# Patient Record
(demographics unavailable — no encounter records)

---

## 2025-05-13 NOTE — PHYSICAL EXAM
[General Appearance - Alert] : alert [General Appearance - In No Acute Distress] : in no acute distress [General Appearance - Well Nourished] : well nourished [Delayed in the Right Toes] : capillary refills normal in right toes [Delayed in the Left Toes] : capillary refills normal in the left toes [] : normal strength/tone [de-identified] : ROM of ankle, subtalar, midtarsal, MPJ, IPJ are adequate bilateral 5/5 muscle power in inversion, eversion, dorsiflexion, plantarflexion bilateral [Skin Turgor] : normal skin turgor [Foot Ulcer] : no foot ulcer [Position Sense Dec.] : diminished position sense at the level of the toes [FreeTextEntry1] : gross epicritic sensation to feet diminished, light touch sensation diminished, sharp/dull sensation intact [Oriented To Time, Place, And Person] : oriented to person, place, and time [Impaired Insight] : insight and judgment were intact [Affect] : the affect was normal

## 2025-05-13 NOTE — HISTORY OF PRESENT ILLNESS
[FreeTextEntry1] : Ms. AMISHA LOPEZ is a 59 year female who is seen in the office for interdigital tinea pedis bilateral feet. Patient denies any current or recent fever, chills, nausea, vomiting, SOB, or chest pain. AAOx3 and in NAD.

## 2025-05-13 NOTE — REASON FOR VISIT
[Follow-Up Visit] : a follow-up visit for [FreeTextEntry2] : interdigital tinea pedis bilateral feet

## 2025-05-13 NOTE — ASSESSMENT
[FreeTextEntry1] : 1) interdigital tinea pedis: recommend use of topical Rx econazole cream antifungal BID + OTC Lamisil cream BID, dry in between toes after showers  Follow up in 1 month [Verbal] : verbal [Patient] : patient [Good - alert, interested, motivated] : Good - alert, interested, motivated [Verbalizes knowledge/Understanding] : Verbalizes knowledge/understanding [Skin Care] : skin care [Pt responsibility to plan of care] : patient responsibility to plan of care

## 2025-05-13 NOTE — REVIEW OF SYSTEMS
[Joint Swelling] : no joint swelling [Joint Stiffness] : no joint stiffness [Limb Swelling] : no limb swelling [Skin Lesions] : skin lesion [Skin Wound] : no skin wound [Itching] : itching [Confused] : no confusion [Convulsions] : no convulsions [Dizziness] : no dizziness [Fainting] : no fainting [Negative] : Psychiatric

## 2025-07-16 NOTE — PHYSICAL EXAM
[General Appearance - Alert] : alert [General Appearance - In No Acute Distress] : in no acute distress [General Appearance - Well Nourished] : well nourished [Delayed in the Right Toes] : capillary refills normal in right toes [Delayed in the Left Toes] : capillary refills normal in the left toes [] : normal strength/tone [de-identified] : ROM of ankle, subtalar, midtarsal, MPJ, IPJ are adequate bilateral 5/5 muscle power in inversion, eversion, dorsiflexion, plantarflexion bilateral [Skin Turgor] : normal skin turgor [Foot Ulcer] : no foot ulcer [FreeTextEntry1] : gross epicritic sensation to feet diminished, light touch sensation diminished, sharp/dull sensation intact [Oriented To Time, Place, And Person] : oriented to person, place, and time [Affect] : the affect was normal [Impaired Insight] : insight and judgment were intact

## 2025-07-16 NOTE — ASSESSMENT
[FreeTextEntry1] : 1) Onychomycosis with onychodystrophy/onychogryphosis: aseptic debridement of thick/elongated, incurvated, mycotic and dystrophic toenails with pain/discomfort x 2 was done 07/15/2025  Patient tolerated all treatments well and without any complications Follow up in 2 months     [Verbal] : verbal [Patient] : patient [Good - alert, interested, motivated] : Good - alert, interested, motivated [Verbalizes knowledge/Understanding] : Verbalizes knowledge/understanding [Pt responsibility to plan of care] : patient responsibility to plan of care

## 2025-07-16 NOTE — REVIEW OF SYSTEMS
[Joint Swelling] : no joint swelling [Joint Stiffness] : no joint stiffness [Limb Swelling] : no limb swelling [Skin Wound] : no skin wound [Itching] : no itching [Confused] : no confusion [Convulsions] : no convulsions [Dizziness] : no dizziness [Fainting] : no fainting [Negative] : Psychiatric

## 2025-07-16 NOTE — HISTORY OF PRESENT ILLNESS
[FreeTextEntry1] : Ms. AMISHA LOPEZ is a 59 year female who is seen in the office for onychomycosis. Patient denies any current or recent fever, chills, nausea, vomiting, SOB, or chest pain. AAOx3 and in NAD.